# Patient Record
Sex: FEMALE | Race: WHITE | NOT HISPANIC OR LATINO | Employment: FULL TIME | URBAN - METROPOLITAN AREA
[De-identification: names, ages, dates, MRNs, and addresses within clinical notes are randomized per-mention and may not be internally consistent; named-entity substitution may affect disease eponyms.]

---

## 2019-05-17 ENCOUNTER — OFFICE VISIT (OUTPATIENT)
Dept: FAMILY MEDICINE CLINIC | Facility: CLINIC | Age: 24
End: 2019-05-17

## 2019-05-17 VITALS
HEIGHT: 68 IN | DIASTOLIC BLOOD PRESSURE: 76 MMHG | BODY MASS INDEX: 28.64 KG/M2 | SYSTOLIC BLOOD PRESSURE: 116 MMHG | WEIGHT: 189 LBS

## 2019-05-17 DIAGNOSIS — R42 VERTIGO: Primary | ICD-10-CM

## 2019-05-17 DIAGNOSIS — H69.81 DYSFUNCTION OF RIGHT EUSTACHIAN TUBE: ICD-10-CM

## 2019-05-17 DIAGNOSIS — J30.89 NON-SEASONAL ALLERGIC RHINITIS, UNSPECIFIED TRIGGER: ICD-10-CM

## 2019-05-17 PROCEDURE — 99204 OFFICE O/P NEW MOD 45 MIN: CPT | Performed by: FAMILY MEDICINE

## 2019-05-17 RX ORDER — FEXOFENADINE HCL 180 MG/1
180 TABLET ORAL DAILY
COMMUNITY

## 2019-05-17 NOTE — PROGRESS NOTES
Zandra Staton presents today to establish care.    Chief Complaint   Patient presents with   • Establish Care     needs new PCP, vertigo issues, pain in side of head, ear ache        HPI   Last week sitting in bed reading with pillow behind back, felt like whole bed was tilted. Improved when she stopped reading. Some vertigo walking down stairs, can't look at feet. Looking down from tall building zooming in and out. The next morning had ice pick pain above right ear. Happened 5 times, winced and brought tears to eyes. Pain continued second day 7-8 times. More earache pain, currently mild pain and like cotton ball in her ear. Followed by chiropractor. Last migraine she had was sinus and under her eyes and associated with nausea and treated with Allegra-D. This headache was different.     Scared of needles if she sees them and has jumping veins.     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 She had pain in her side when she tried it in the past.     [id number] during internship had black mold in air infection, had major sinus infection.     PHQ-2/PHQ-9 Depression Screening 5/17/2019   Little interest or pleasure in doing things 0   Feeling down, depressed, or hopeless 0   Total Score 0         Review of Systems   Constitutional: Negative.    HENT: Positive for ear pain.    Eyes: Positive for itching.   Respiratory: Negative.    Cardiovascular: Negative.    Gastrointestinal: Negative.    Endocrine: Negative.    Genitourinary: Negative.    Musculoskeletal: Negative.    Skin: Negative.    Neurological: Negative.    Hematological: Bruises/bleeds easily.   Psychiatric/Behavioral: Negative.         Past Medical History:   Diagnosis Date   • Environmental allergies    • Functional asplenia    • Infectious mononucleosis 2012   • Splenic infarct 2012   • Vertigo         Past Surgical History:   Procedure Laterality Date   • MOLE REMOVAL          Family History  "  Problem Relation Age of Onset   • Anxiety disorder Brother    • Depression Brother    • Asthma Brother    • Breast cancer Maternal Grandmother    • Diabetes Maternal Grandfather    • Anxiety disorder Paternal Grandmother    • Arthritis Paternal Grandmother    • Skin cancer Paternal Grandmother    • Anxiety disorder Paternal Grandfather    • Depression Paternal Grandfather    • Other Mother         preskin cancer   • Asthma Mother         Social History     Socioeconomic History   • Marital status: Single     Spouse name: Not on file   • Number of children: Not on file   • Years of education: Not on file   • Highest education level: Not on file   Tobacco Use   • Smoking status: Never Smoker   • Smokeless tobacco: Never Used   Substance and Sexual Activity   • Alcohol use: Yes     Alcohol/week: 1.2 oz     Types: 2 Glasses of wine per week   • Drug use: No   Social History Narrative    Bina Staton mother    Jagdish Staton father         Current Outpatient Medications on File Prior to Visit   Medication Sig Dispense Refill   • fexofenadine (ALLEGRA) 180 MG tablet Take 180 mg by mouth Daily.     • glycopyrrolate (ROBINUL) 1 MG tablet Take 1 mg by mouth 3 (Three) Times a Day.     • Levonorgestrel-Ethinyl Estrad (LESSINA PO) Take  by mouth.       No current facility-administered medications on file prior to visit.        Allergies   Allergen Reactions   • Ibuprofen Other (See Comments)     Hx of Mono and spleen problems.         Visit Vitals  /76 (BP Location: Left arm, Patient Position: Sitting, Cuff Size: Adult)   Ht 172.7 cm (68\")   Wt 85.7 kg (189 lb)   BMI 28.74 kg/m²        Physical Exam   Constitutional: She is oriented to person, place, and time. No distress.   HENT:   Right Ear: Hearing, tympanic membrane, external ear and ear canal normal.   Left Ear: Hearing, tympanic membrane, external ear and ear canal normal.   Nose: Mucosal edema ( Mild hypertrophy left) present.   Mouth/Throat: Oropharynx is clear " and moist and mucous membranes are normal. No oral lesions. No tonsillar exudate.   Eyes: Conjunctivae are normal. Pupils are equal, round, and reactive to light.   Neck: Neck supple. No thyromegaly present.   Cardiovascular: Normal rate and regular rhythm.   No murmur heard.  Pulses:       Posterior tibial pulses are 2+ on the right side, and 2+ on the left side.   Pulmonary/Chest: Effort normal and breath sounds normal.   Abdominal: Soft. There is no hepatosplenomegaly. There is no tenderness.   Lymphadenopathy:        Head (right side): No preauricular and no posterior auricular adenopathy present.        Head (left side): No preauricular and no posterior auricular adenopathy present.     She has no cervical adenopathy.   Neurological: She is alert and oriented to person, place, and time.   Skin: Skin is warm and dry. No rash noted.   Psychiatric: She has a normal mood and affect.   Vitals reviewed.       No results found for this or any previous visit.     Zandra was seen today for establish care.    Diagnoses and all orders for this visit:    Vertigo  -     Ambulatory Referral to ENT (Otolaryngology)    Non-seasonal allergic rhinitis, unspecified trigger  -     Ambulatory Referral to ENT (Otolaryngology)    Dysfunction of right eustachian tube  -     Ambulatory Referral to ENT (Otolaryngology)    Discussed with patient symptoms could be related to positional vertigo.  Additionally she could be having eustachian tube dysfunction related to allergies.  Recommend to continue Allegra and limit use of Allegra-D.  Further evaluation with ear nose and throat.  In regards to her headaches they have now resolved.  If severe headaches return I would consider evaluation with an MRI given her symptoms of dizziness and headache.    Prior PCP records requested and immunizations.  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  Need to verify her meningitis vaccine status.    Return  if symptoms worsen or fail to improve.